# Patient Record
Sex: MALE | Race: WHITE | NOT HISPANIC OR LATINO | Employment: OTHER | URBAN - METROPOLITAN AREA
[De-identification: names, ages, dates, MRNs, and addresses within clinical notes are randomized per-mention and may not be internally consistent; named-entity substitution may affect disease eponyms.]

---

## 2019-09-23 ENCOUNTER — HOSPITAL ENCOUNTER (OUTPATIENT)
Dept: RADIOLOGY | Facility: MEDICAL CENTER | Age: 84
End: 2019-09-23

## 2019-09-23 ENCOUNTER — HOSPITAL ENCOUNTER (INPATIENT)
Facility: MEDICAL CENTER | Age: 84
LOS: 2 days | DRG: 084 | End: 2019-09-25
Attending: EMERGENCY MEDICINE | Admitting: FAMILY MEDICINE
Payer: MEDICARE

## 2019-09-23 DIAGNOSIS — S06.5XAA SDH (SUBDURAL HEMATOMA) (HCC): ICD-10-CM

## 2019-09-23 DIAGNOSIS — I95.1 ORTHOSTATIC HYPOTENSION: ICD-10-CM

## 2019-09-23 DIAGNOSIS — I10 ESSENTIAL HYPERTENSION: ICD-10-CM

## 2019-09-23 DIAGNOSIS — R29.6 FREQUENT FALLS: ICD-10-CM

## 2019-09-23 PROBLEM — E83.42 HYPOMAGNESEMIA: Status: ACTIVE | Noted: 2019-09-23

## 2019-09-23 PROBLEM — E87.6 HYPOKALEMIA: Status: ACTIVE | Noted: 2019-09-23

## 2019-09-23 LAB
ABO + RH BLD: NORMAL
ABO GROUP BLD: NORMAL
ALBUMIN SERPL BCP-MCNC: 4.2 G/DL (ref 3.2–4.9)
ALBUMIN/GLOB SERPL: 1.4 G/DL
ALP SERPL-CCNC: 74 U/L (ref 30–99)
ALT SERPL-CCNC: 11 U/L (ref 2–50)
ANION GAP SERPL CALC-SCNC: 9 MMOL/L (ref 0–11.9)
APTT PPP: 32.3 SEC (ref 24.7–36)
AST SERPL-CCNC: 15 U/L (ref 12–45)
BILIRUB SERPL-MCNC: 0.5 MG/DL (ref 0.1–1.5)
BLD GP AB SCN SERPL QL: NORMAL
BUN SERPL-MCNC: 25 MG/DL (ref 8–22)
CALCIUM SERPL-MCNC: 9.6 MG/DL (ref 8.5–10.5)
CFT BLD TEG: 6.4 MIN (ref 5–10)
CHLORIDE SERPL-SCNC: 99 MMOL/L (ref 96–112)
CLOT ANGLE BLD TEG: 67.8 DEGREES (ref 53–72)
CLOT LYSIS 30M P MA LENFR BLD TEG: 0.2 % (ref 0–8)
CO2 SERPL-SCNC: 31 MMOL/L (ref 20–33)
CREAT SERPL-MCNC: 0.9 MG/DL (ref 0.5–1.4)
CT.EXTRINSIC BLD ROTEM: 1.7 MIN (ref 1–3)
ERYTHROCYTE [DISTWIDTH] IN BLOOD BY AUTOMATED COUNT: 46.1 FL (ref 35.9–50)
ETHANOL BLD-MCNC: 0.01 G/DL
GLOBULIN SER CALC-MCNC: 3 G/DL (ref 1.9–3.5)
GLUCOSE SERPL-MCNC: 91 MG/DL (ref 65–99)
HCT VFR BLD AUTO: 40.7 % (ref 42–52)
HGB BLD-MCNC: 13 G/DL (ref 14–18)
INR PPP: 1.07 (ref 0.87–1.13)
MAGNESIUM SERPL-MCNC: 1.9 MG/DL (ref 1.5–2.5)
MCF BLD TEG: 69.2 MM (ref 50–70)
MCH RBC QN AUTO: 30.4 PG (ref 27–33)
MCHC RBC AUTO-ENTMCNC: 31.9 G/DL (ref 33.7–35.3)
MCV RBC AUTO: 95.1 FL (ref 81.4–97.8)
PA AA BLD-ACNC: 2.9 %
PA ADP BLD-ACNC: 0 %
PHOSPHATE SERPL-MCNC: 3.2 MG/DL (ref 2.5–4.5)
PLATELET # BLD AUTO: 263 K/UL (ref 164–446)
PMV BLD AUTO: 8.6 FL (ref 9–12.9)
POTASSIUM SERPL-SCNC: 3.5 MMOL/L (ref 3.6–5.5)
PROT SERPL-MCNC: 7.2 G/DL (ref 6–8.2)
PROTHROMBIN TIME: 14.2 SEC (ref 12–14.6)
RBC # BLD AUTO: 4.28 M/UL (ref 4.7–6.1)
RH BLD: NORMAL
SODIUM SERPL-SCNC: 139 MMOL/L (ref 135–145)
TEG ALGORITHM TGALG: NORMAL
WBC # BLD AUTO: 8.7 K/UL (ref 4.8–10.8)

## 2019-09-23 PROCEDURE — 96365 THER/PROPH/DIAG IV INF INIT: CPT

## 2019-09-23 PROCEDURE — 85384 FIBRINOGEN ACTIVITY: CPT

## 2019-09-23 PROCEDURE — 770020 HCHG ROOM/CARE - TELE (206)

## 2019-09-23 PROCEDURE — 80307 DRUG TEST PRSMV CHEM ANLYZR: CPT

## 2019-09-23 PROCEDURE — 305950 HCHG YELLOW TRAUMA ACT PRE-NOTIFY NO CC

## 2019-09-23 PROCEDURE — 80053 COMPREHEN METABOLIC PANEL: CPT

## 2019-09-23 PROCEDURE — 83735 ASSAY OF MAGNESIUM: CPT

## 2019-09-23 PROCEDURE — 99223 1ST HOSP IP/OBS HIGH 75: CPT | Performed by: FAMILY MEDICINE

## 2019-09-23 PROCEDURE — 84100 ASSAY OF PHOSPHORUS: CPT

## 2019-09-23 PROCEDURE — 86850 RBC ANTIBODY SCREEN: CPT

## 2019-09-23 PROCEDURE — A9270 NON-COVERED ITEM OR SERVICE: HCPCS | Performed by: FAMILY MEDICINE

## 2019-09-23 PROCEDURE — 85576 BLOOD PLATELET AGGREGATION: CPT | Mod: 91

## 2019-09-23 PROCEDURE — 85347 COAGULATION TIME ACTIVATED: CPT

## 2019-09-23 PROCEDURE — 86900 BLOOD TYPING SEROLOGIC ABO: CPT

## 2019-09-23 PROCEDURE — 85730 THROMBOPLASTIN TIME PARTIAL: CPT

## 2019-09-23 PROCEDURE — 85610 PROTHROMBIN TIME: CPT

## 2019-09-23 PROCEDURE — 700102 HCHG RX REV CODE 250 W/ 637 OVERRIDE(OP): Performed by: FAMILY MEDICINE

## 2019-09-23 PROCEDURE — 86901 BLOOD TYPING SEROLOGIC RH(D): CPT

## 2019-09-23 PROCEDURE — 700111 HCHG RX REV CODE 636 W/ 250 OVERRIDE (IP): Performed by: FAMILY MEDICINE

## 2019-09-23 PROCEDURE — 700105 HCHG RX REV CODE 258: Performed by: FAMILY MEDICINE

## 2019-09-23 PROCEDURE — 85027 COMPLETE CBC AUTOMATED: CPT

## 2019-09-23 PROCEDURE — 99285 EMERGENCY DEPT VISIT HI MDM: CPT

## 2019-09-23 RX ORDER — BISACODYL 10 MG
10 SUPPOSITORY, RECTAL RECTAL
Status: DISCONTINUED | OUTPATIENT
Start: 2019-09-23 | End: 2019-09-25 | Stop reason: HOSPADM

## 2019-09-23 RX ORDER — FINASTERIDE 5 MG/1
5 TABLET, FILM COATED ORAL DAILY
COMMUNITY

## 2019-09-23 RX ORDER — FINASTERIDE 5 MG/1
5 TABLET, FILM COATED ORAL DAILY
Status: DISCONTINUED | OUTPATIENT
Start: 2019-09-24 | End: 2019-09-25 | Stop reason: HOSPADM

## 2019-09-23 RX ORDER — LABETALOL HYDROCHLORIDE 5 MG/ML
10 INJECTION, SOLUTION INTRAVENOUS EVERY 4 HOURS PRN
Status: DISCONTINUED | OUTPATIENT
Start: 2019-09-23 | End: 2019-09-25 | Stop reason: HOSPADM

## 2019-09-23 RX ORDER — LOSARTAN POTASSIUM 25 MG/1
25 TABLET ORAL DAILY
Status: ON HOLD | COMMUNITY
End: 2019-09-25

## 2019-09-23 RX ORDER — POLYETHYLENE GLYCOL 3350 17 G/17G
1 POWDER, FOR SOLUTION ORAL
Status: DISCONTINUED | OUTPATIENT
Start: 2019-09-23 | End: 2019-09-25 | Stop reason: HOSPADM

## 2019-09-23 RX ORDER — LOSARTAN POTASSIUM 25 MG/1
25 TABLET ORAL DAILY
Status: DISCONTINUED | OUTPATIENT
Start: 2019-09-24 | End: 2019-09-25

## 2019-09-23 RX ORDER — AMOXICILLIN 250 MG
2 CAPSULE ORAL 2 TIMES DAILY
Status: DISCONTINUED | OUTPATIENT
Start: 2019-09-23 | End: 2019-09-25 | Stop reason: HOSPADM

## 2019-09-23 RX ORDER — POTASSIUM CHLORIDE 20 MEQ/1
20 TABLET, EXTENDED RELEASE ORAL DAILY
Status: DISCONTINUED | OUTPATIENT
Start: 2019-09-23 | End: 2019-09-25 | Stop reason: HOSPADM

## 2019-09-23 RX ORDER — SODIUM CHLORIDE 9 MG/ML
INJECTION, SOLUTION INTRAVENOUS CONTINUOUS
Status: DISCONTINUED | OUTPATIENT
Start: 2019-09-23 | End: 2019-09-24

## 2019-09-23 RX ORDER — ENALAPRILAT 1.25 MG/ML
1.25 INJECTION INTRAVENOUS EVERY 6 HOURS PRN
Status: DISCONTINUED | OUTPATIENT
Start: 2019-09-23 | End: 2019-09-25 | Stop reason: HOSPADM

## 2019-09-23 RX ORDER — ACETAMINOPHEN 325 MG/1
650 TABLET ORAL EVERY 6 HOURS PRN
Status: DISCONTINUED | OUTPATIENT
Start: 2019-09-23 | End: 2019-09-25 | Stop reason: HOSPADM

## 2019-09-23 RX ORDER — MAGNESIUM SULFATE HEPTAHYDRATE 40 MG/ML
2 INJECTION, SOLUTION INTRAVENOUS ONCE
Status: COMPLETED | OUTPATIENT
Start: 2019-09-23 | End: 2019-09-23

## 2019-09-23 RX ADMIN — MAGNESIUM SULFATE IN WATER 2 G: 40 INJECTION, SOLUTION INTRAVENOUS at 21:46

## 2019-09-23 RX ADMIN — SODIUM CHLORIDE: 9 INJECTION, SOLUTION INTRAVENOUS at 19:39

## 2019-09-23 RX ADMIN — POTASSIUM CHLORIDE 20 MEQ: 1500 TABLET, EXTENDED RELEASE ORAL at 19:39

## 2019-09-23 RX ADMIN — ENALAPRILAT 1.25 MG: 1.25 INJECTION INTRAVENOUS at 23:45

## 2019-09-23 SDOH — HEALTH STABILITY: MENTAL HEALTH: HOW OFTEN DO YOU HAVE A DRINK CONTAINING ALCOHOL?: NEVER

## 2019-09-23 ASSESSMENT — ENCOUNTER SYMPTOMS
SENSORY CHANGE: 0
BLURRED VISION: 0
BACK PAIN: 0
SPEECH CHANGE: 0
WHEEZING: 0
HEARTBURN: 0
DIZZINESS: 0
DIAPHORESIS: 0
NAUSEA: 0
FALLS: 1
NERVOUS/ANXIOUS: 0
DIARRHEA: 0
ABDOMINAL PAIN: 0
SEIZURES: 0
FLANK PAIN: 0
FOCAL WEAKNESS: 0
HEADACHES: 0
SHORTNESS OF BREATH: 0
LOSS OF CONSCIOUSNESS: 0
NECK PAIN: 0
VOMITING: 0
SORE THROAT: 0
CHILLS: 0
COUGH: 0
PALPITATIONS: 0
FEVER: 0
WEAKNESS: 0

## 2019-09-23 ASSESSMENT — LIFESTYLE VARIABLES
TOTAL SCORE: 0
DO YOU DRINK ALCOHOL: NO
CONSUMPTION TOTAL: INCOMPLETE
EVER FELT BAD OR GUILTY ABOUT YOUR DRINKING: NO
TOTAL SCORE: 0
HAVE PEOPLE ANNOYED YOU BY CRITICIZING YOUR DRINKING: NO
HAVE YOU EVER FELT YOU SHOULD CUT DOWN ON YOUR DRINKING: NO
ALCOHOL_USE: NO
EVER_SMOKED: NEVER
TOTAL SCORE: 0
EVER HAD A DRINK FIRST THING IN THE MORNING TO STEADY YOUR NERVES TO GET RID OF A HANGOVER: NO

## 2019-09-23 ASSESSMENT — PATIENT HEALTH QUESTIONNAIRE - PHQ9
2. FEELING DOWN, DEPRESSED, IRRITABLE, OR HOPELESS: NOT AT ALL
SUM OF ALL RESPONSES TO PHQ9 QUESTIONS 1 AND 2: 0
1. LITTLE INTEREST OR PLEASURE IN DOING THINGS: NOT AT ALL

## 2019-09-24 ENCOUNTER — HOSPITAL ENCOUNTER (OUTPATIENT)
Dept: RADIOLOGY | Facility: MEDICAL CENTER | Age: 84
End: 2019-09-24
Attending: FAMILY MEDICINE
Payer: MEDICARE

## 2019-09-24 PROBLEM — E53.8 VITAMIN B12 DEFICIENCY: Status: ACTIVE | Noted: 2019-09-24

## 2019-09-24 PROBLEM — I95.1 ORTHOSTATIC HYPOTENSION: Status: ACTIVE | Noted: 2019-09-24

## 2019-09-24 PROBLEM — E55.9 VITAMIN D DEFICIENCY: Status: ACTIVE | Noted: 2019-09-24

## 2019-09-24 LAB
25(OH)D3 SERPL-MCNC: 21 NG/ML (ref 30–100)
ANION GAP SERPL CALC-SCNC: 9 MMOL/L (ref 0–11.9)
BASOPHILS # BLD AUTO: 0.6 % (ref 0–1.8)
BASOPHILS # BLD: 0.05 K/UL (ref 0–0.12)
BUN SERPL-MCNC: 21 MG/DL (ref 8–22)
CALCIUM SERPL-MCNC: 8.8 MG/DL (ref 8.5–10.5)
CHLORIDE SERPL-SCNC: 103 MMOL/L (ref 96–112)
CO2 SERPL-SCNC: 26 MMOL/L (ref 20–33)
CREAT SERPL-MCNC: 0.72 MG/DL (ref 0.5–1.4)
EOSINOPHIL # BLD AUTO: 0.04 K/UL (ref 0–0.51)
EOSINOPHIL NFR BLD: 0.5 % (ref 0–6.9)
ERYTHROCYTE [DISTWIDTH] IN BLOOD BY AUTOMATED COUNT: 45.1 FL (ref 35.9–50)
GLUCOSE SERPL-MCNC: 89 MG/DL (ref 65–99)
HCT VFR BLD AUTO: 37.8 % (ref 42–52)
HGB BLD-MCNC: 12.5 G/DL (ref 14–18)
IMM GRANULOCYTES # BLD AUTO: 0.03 K/UL (ref 0–0.11)
IMM GRANULOCYTES NFR BLD AUTO: 0.4 % (ref 0–0.9)
LYMPHOCYTES # BLD AUTO: 1.09 K/UL (ref 1–4.8)
LYMPHOCYTES NFR BLD: 14.1 % (ref 22–41)
MCH RBC QN AUTO: 31.6 PG (ref 27–33)
MCHC RBC AUTO-ENTMCNC: 33.1 G/DL (ref 33.7–35.3)
MCV RBC AUTO: 95.5 FL (ref 81.4–97.8)
MONOCYTES # BLD AUTO: 0.84 K/UL (ref 0–0.85)
MONOCYTES NFR BLD AUTO: 10.9 % (ref 0–13.4)
NEUTROPHILS # BLD AUTO: 5.67 K/UL (ref 1.82–7.42)
NEUTROPHILS NFR BLD: 73.5 % (ref 44–72)
NRBC # BLD AUTO: 0 K/UL
NRBC BLD-RTO: 0 /100 WBC
PLATELET # BLD AUTO: 226 K/UL (ref 164–446)
PMV BLD AUTO: 8.9 FL (ref 9–12.9)
POTASSIUM SERPL-SCNC: 3.6 MMOL/L (ref 3.6–5.5)
RBC # BLD AUTO: 3.96 M/UL (ref 4.7–6.1)
SODIUM SERPL-SCNC: 138 MMOL/L (ref 135–145)
TSH SERPL DL<=0.005 MIU/L-ACNC: 3.11 UIU/ML (ref 0.38–5.33)
VIT B12 SERPL-MCNC: 238 PG/ML (ref 211–911)
WBC # BLD AUTO: 7.7 K/UL (ref 4.8–10.8)

## 2019-09-24 PROCEDURE — 700102 HCHG RX REV CODE 250 W/ 637 OVERRIDE(OP): Performed by: FAMILY MEDICINE

## 2019-09-24 PROCEDURE — 700105 HCHG RX REV CODE 258: Performed by: FAMILY MEDICINE

## 2019-09-24 PROCEDURE — 700105 HCHG RX REV CODE 258: Performed by: INTERNAL MEDICINE

## 2019-09-24 PROCEDURE — 97161 PT EVAL LOW COMPLEX 20 MIN: CPT

## 2019-09-24 PROCEDURE — 84443 ASSAY THYROID STIM HORMONE: CPT

## 2019-09-24 PROCEDURE — 80048 BASIC METABOLIC PNL TOTAL CA: CPT

## 2019-09-24 PROCEDURE — 85025 COMPLETE CBC W/AUTO DIFF WBC: CPT

## 2019-09-24 PROCEDURE — 700102 HCHG RX REV CODE 250 W/ 637 OVERRIDE(OP): Performed by: INTERNAL MEDICINE

## 2019-09-24 PROCEDURE — A9270 NON-COVERED ITEM OR SERVICE: HCPCS | Performed by: FAMILY MEDICINE

## 2019-09-24 PROCEDURE — 82607 VITAMIN B-12: CPT

## 2019-09-24 PROCEDURE — 82306 VITAMIN D 25 HYDROXY: CPT

## 2019-09-24 PROCEDURE — 70450 CT HEAD/BRAIN W/O DYE: CPT

## 2019-09-24 PROCEDURE — 700101 HCHG RX REV CODE 250: Performed by: FAMILY MEDICINE

## 2019-09-24 PROCEDURE — 97535 SELF CARE MNGMENT TRAINING: CPT

## 2019-09-24 PROCEDURE — A9270 NON-COVERED ITEM OR SERVICE: HCPCS | Performed by: INTERNAL MEDICINE

## 2019-09-24 PROCEDURE — 99233 SBSQ HOSP IP/OBS HIGH 50: CPT | Performed by: INTERNAL MEDICINE

## 2019-09-24 PROCEDURE — 36415 COLL VENOUS BLD VENIPUNCTURE: CPT

## 2019-09-24 PROCEDURE — 770020 HCHG ROOM/CARE - TELE (206)

## 2019-09-24 PROCEDURE — 700111 HCHG RX REV CODE 636 W/ 250 OVERRIDE (IP): Performed by: FAMILY MEDICINE

## 2019-09-24 RX ORDER — ERGOCALCIFEROL 1.25 MG/1
50000 CAPSULE ORAL
Status: DISCONTINUED | OUTPATIENT
Start: 2019-09-24 | End: 2019-09-25 | Stop reason: HOSPADM

## 2019-09-24 RX ORDER — FAMOTIDINE 20 MG/1
20 TABLET, FILM COATED ORAL 2 TIMES DAILY
Status: DISCONTINUED | OUTPATIENT
Start: 2019-09-24 | End: 2019-09-25 | Stop reason: HOSPADM

## 2019-09-24 RX ORDER — SODIUM CHLORIDE 9 MG/ML
1000 INJECTION, SOLUTION INTRAVENOUS ONCE
Status: COMPLETED | OUTPATIENT
Start: 2019-09-24 | End: 2019-09-24

## 2019-09-24 RX ORDER — CHOLECALCIFEROL (VITAMIN D3) 125 MCG
1000 CAPSULE ORAL DAILY
Status: DISCONTINUED | OUTPATIENT
Start: 2019-09-24 | End: 2019-09-25 | Stop reason: HOSPADM

## 2019-09-24 RX ORDER — CYANOCOBALAMIN 1000 UG/ML
1000 INJECTION, SOLUTION INTRAMUSCULAR; SUBCUTANEOUS DAILY
Status: DISCONTINUED | OUTPATIENT
Start: 2019-09-24 | End: 2019-09-25 | Stop reason: HOSPADM

## 2019-09-24 RX ADMIN — SENNOSIDES, DOCUSATE SODIUM 2 TABLET: 50; 8.6 TABLET, FILM COATED ORAL at 05:01

## 2019-09-24 RX ADMIN — SODIUM CHLORIDE: 9 INJECTION, SOLUTION INTRAVENOUS at 12:34

## 2019-09-24 RX ADMIN — CYANOCOBALAMIN TAB 500 MCG 1000 MCG: 500 TAB at 15:26

## 2019-09-24 RX ADMIN — ENALAPRILAT 1.25 MG: 1.25 INJECTION INTRAVENOUS at 12:20

## 2019-09-24 RX ADMIN — SENNOSIDES, DOCUSATE SODIUM 2 TABLET: 50; 8.6 TABLET, FILM COATED ORAL at 17:23

## 2019-09-24 RX ADMIN — FINASTERIDE 5 MG: 5 TABLET, FILM COATED ORAL at 05:01

## 2019-09-24 RX ADMIN — VITAMIN D, TAB 1000IU (100/BT) 1000 UNITS: 25 TAB at 05:01

## 2019-09-24 RX ADMIN — SODIUM CHLORIDE 1000 ML: 9 INJECTION, SOLUTION INTRAVENOUS at 15:31

## 2019-09-24 RX ADMIN — LOSARTAN POTASSIUM 25 MG: 25 TABLET ORAL at 05:01

## 2019-09-24 RX ADMIN — LABETALOL HYDROCHLORIDE 10 MG: 5 INJECTION, SOLUTION INTRAVENOUS at 23:55

## 2019-09-24 RX ADMIN — ENALAPRILAT 1.25 MG: 1.25 INJECTION INTRAVENOUS at 18:41

## 2019-09-24 RX ADMIN — ERGOCALCIFEROL 50000 UNITS: 1.25 CAPSULE ORAL at 15:37

## 2019-09-24 ASSESSMENT — LIFESTYLE VARIABLES
TOTAL SCORE: 0
CONSUMPTION TOTAL: NEGATIVE
ON A TYPICAL DAY WHEN YOU DRINK ALCOHOL HOW MANY DRINKS DO YOU HAVE: 0
TOTAL SCORE: 0
EVER HAD A DRINK FIRST THING IN THE MORNING TO STEADY YOUR NERVES TO GET RID OF A HANGOVER: NO
HAVE PEOPLE ANNOYED YOU BY CRITICIZING YOUR DRINKING: NO
EVER FELT BAD OR GUILTY ABOUT YOUR DRINKING: NO
TOTAL SCORE: 0
ALCOHOL_USE: NO
HOW MANY TIMES IN THE PAST YEAR HAVE YOU HAD 5 OR MORE DRINKS IN A DAY: 0
AVERAGE NUMBER OF DAYS PER WEEK YOU HAVE A DRINK CONTAINING ALCOHOL: 0
HAVE YOU EVER FELT YOU SHOULD CUT DOWN ON YOUR DRINKING: NO

## 2019-09-24 ASSESSMENT — COGNITIVE AND FUNCTIONAL STATUS - GENERAL
PERSONAL GROOMING: A LITTLE
MOBILITY SCORE: 18
MOVING FROM LYING ON BACK TO SITTING ON SIDE OF FLAT BED: A LITTLE
DRESSING REGULAR UPPER BODY CLOTHING: A LITTLE
TOILETING: A LITTLE
SUGGESTED CMS G CODE MODIFIER MOBILITY: CK
TURNING FROM BACK TO SIDE WHILE IN FLAT BAD: A LITTLE
CLIMB 3 TO 5 STEPS WITH RAILING: A LITTLE
HELP NEEDED FOR BATHING: A LITTLE
TURNING FROM BACK TO SIDE WHILE IN FLAT BAD: A LITTLE
EATING MEALS: A LITTLE
MOVING TO AND FROM BED TO CHAIR: A LITTLE
SUGGESTED CMS G CODE MODIFIER MOBILITY: CK
DRESSING REGULAR LOWER BODY CLOTHING: A LITTLE
STANDING UP FROM CHAIR USING ARMS: A LITTLE
SUGGESTED CMS G CODE MODIFIER DAILY ACTIVITY: CK
DAILY ACTIVITIY SCORE: 18
MOVING TO AND FROM BED TO CHAIR: A LITTLE
STANDING UP FROM CHAIR USING ARMS: A LITTLE
MOVING FROM LYING ON BACK TO SITTING ON SIDE OF FLAT BED: A LITTLE
WALKING IN HOSPITAL ROOM: A LITTLE
WALKING IN HOSPITAL ROOM: A LITTLE
MOBILITY SCORE: 18
CLIMB 3 TO 5 STEPS WITH RAILING: A LITTLE

## 2019-09-24 ASSESSMENT — ENCOUNTER SYMPTOMS
COUGH: 0
ABDOMINAL PAIN: 0
TINGLING: 0
DIZZINESS: 1
BLURRED VISION: 0
VOMITING: 0
DOUBLE VISION: 0
FEVER: 0
MYALGIAS: 0
HEADACHES: 0
DIARRHEA: 0
SHORTNESS OF BREATH: 0
CONSTIPATION: 0
NAUSEA: 0
CHILLS: 0
TREMORS: 0
HEARTBURN: 0

## 2019-09-24 ASSESSMENT — GAIT ASSESSMENTS
DISTANCE (FEET): 400
ASSISTIVE DEVICE: FRONT WHEEL WALKER
GAIT LEVEL OF ASSIST: SUPERVISED
DEVIATION: BRADYKINETIC;DECREASED BASE OF SUPPORT;SHUFFLED GAIT;DECREASED HEEL STRIKE;DECREASED TOE OFF

## 2019-09-24 NOTE — PROGRESS NOTES
Patient arrived from ER with SHON Hankins. Patient able to stand on scale and walk a few steps to bed. Patient no complaints of pain or dizziness. Oriented to room, call light, bed alarm, fall risks.

## 2019-09-24 NOTE — DISCHARGE PLANNING
Trauma Response    Referral: Trauma Yellow Response    Intervention: SW responded to trauma yellow.  Pt was BIB [facility] after GLF.  Pt was alert and talking upon arrival.  Pts name is Fred Osborne (: 1926).  SW obtained the following pt information: [facility], hitting back of his head.     Plan: SW will continue to follow as needed.

## 2019-09-24 NOTE — CARE PLAN
Problem: Safety  Goal: Will remain free from injury  Outcome: PROGRESSING AS EXPECTED   Safety precautions and fall prevention interventions in place. Fall prevention education provided, pt verbalized understanding. Bed in low/locked position, treaded socks on pt, call bell is within reach. Appropriate devices provided with ambulation assistance. Pt calls appropriately for help.   Problem: Knowledge Deficit  Goal: Knowledge of disease process/condition, treatment plan, diagnostic tests, and medications will improve  Outcome: PROGRESSING AS EXPECTED   Pt and family updated on POC, including medications, treatment and discharge planning. Questions answered as needed, pt and family verbalized understanding. Encouraged to voice further questions/concerns.

## 2019-09-24 NOTE — CONSULTS
Trauma Consultation  9/23/2019    Attending Physician: Tommy Rivera MD.     CC: Trauma The patient was triaged as a Trauma Yellow Transfer in accordance with established pre hospital protocols. An expeditious primary and secondary survey with required adjuncts was conducted. See trauma narrator for full details.    HPI: This is a 89 y.o. male who sustained a ground level fall this morning and hit the back of his head on the ground. He denies losing consciousness. He was taken to his primary care physician who apparently ordered a CT head which showed chronic bilateral subdural hemorrhages. [place] [facility] [place] a trauma yellow on the suspicion that the patient takes aspirin. The patient denies taking aspirin and instead says he takes motrin.     Past Medical History:   Diagnosis Date   • Hypertension    BPH  GERD      Denies major prior surgery    Current Facility-Administered Medications   Medication Dose Route Frequency Provider Last Rate Last Dose   • [START ON 9/24/2019] finasteride (PROSCAR) tablet 5 mg  5 mg Oral DAILY Bogdan Saez M.D.       • [START ON 9/24/2019] losartan (COZAAR) tablet 25 mg  25 mg Oral DAILY Bogdan Saez M.D.       • [START ON 9/24/2019] vitamin D (cholecalciferol) tablet 1,000 Units  1,000 Units Oral DAILY Bogdan Saez M.D.       • senna-docusate (PERICOLACE or SENOKOT S) 8.6-50 MG per tablet 2 Tab  2 Tab Oral BID Bogdan Saez M.D.        And   • polyethylene glycol/lytes (MIRALAX) PACKET 1 Packet  1 Packet Oral QDAY PRN Bogdan Saez M.D.        And   • magnesium hydroxide (MILK OF MAGNESIA) suspension 30 mL  30 mL Oral QDAY PRN Bogdan Saez M.D.        And   • bisacodyl (DULCOLAX) suppository 10 mg  10 mg Rectal QDAY PRN Bogdan Saez M.D.       • NS infusion   Intravenous Continuous Bogdan Saez M.D. 50 mL/hr at 09/23/19 1939     • acetaminophen (TYLENOL) tablet 650 mg  650 mg Oral Q6HRS PRN Bogdan Saez M.D.       •  "enalaprilat (VASOTEC) injection 1.25 mg  1.25 mg Intravenous Q6HRS PRN Bogdan Saez M.D.       • labetalol (NORMODYNE,TRANDATE) injection 10 mg  10 mg Intravenous Q4HRS PRN Bogdan Saez M.D.       • potassium chloride SA (Kdur) tablet 20 mEq  20 mEq Oral DAILY Bogdan Saez M.D.   20 mEq at 09/23/19 1939   • magnesium sulfate IVPB premix 2 g  2 g Intravenous Once Bogdan Saez M.D.         Current Outpatient Medications   Medication Sig Dispense Refill   • losartan (COZAAR) 25 MG Tab Take 25 mg by mouth every day.     • vitamin D (CHOLECALCIFEROL) 1000 UNIT Tab Take 1,000 Units by mouth every day.     • finasteride (PROSCAR) 5 MG Tab Take 5 mg by mouth every day.         Social History     Tobacco Use   • Smoking status: Never Smoker   Substance Use Topics   • Alcohol use: Not Currently     Frequency: Never       Family History   Family history unknown: Yes       Allergies:  Patient has no known allergies.    Review of Systems:  Unable to assess due to the acuity of the situation      Physical Exam:  /70   Pulse 60   Temp 36.7 °C (98.1 °F) (Temporal)   Resp 17   Ht 1.651 m (5' 5\")   Wt 52.2 kg (115 lb)   SpO2 92%     Constitutional: Awake, alert, oriented x3. No acute distress. GCS 15. E4 V5 M6.  Head: No cephalohematoma. Pupils 4-3 reactive bilaterally. Midface stable. No malocclusion.    Neck: No tracheal deviation. No midline cervical spine tenderness.  No cervical seatbelt sign.  Cardiovascular: Normal rate, regular rhythm, normal heart sounds and intact distal pulses.  Exam reveals no gallop and no friction rub.  No murmur heard.  Pulmonary/Chest: Clavicles nontender to palpation. There is not any chest wall tenderness bilaterally.  No crepitus. Positive breath sounds bilaterally.   Abdominal: Soft, nondistended. Nontender to palpation. Pelvis is stable to anterior-posterior compression. No abdominal seatbelt sign.   Musculoskeletal: Right upper extremity grossly atraumatic, " palpable radial pulse. 5/5  strength. Full ROM and strength at elbow.  Left upper extremity grossly atraumatic, palpable radial pulse. 5/5  strength. Full ROM and strength at elbow.  Right lower extremity grossly atraumatic. 5/5 strength in ankle plantar flexion and dorsiflexion. No pain and full ROM at right knee and hip. 2+ DP pulse.  Left  lower extremity grossly atraumatic. 5/5 strength in ankle plantar flexion and dorsiflexion. No pain and full ROM at left knee and hip. 2+ DP pulse.  Back: Midline thoracic and lumbar spines are nontender to palpation. No step-offs. Mild sacral erythema present.  : Normal male external genitalia. Rectal exam not done. No blood visible at urethral meatus.   Neurological: Sensation grossly intact to light touch dorsum and plantar surfaces of both feet and the medial and lateral aspects of both lower legs.  Sensation grossly intact to light touch dorsum and plantar surfaces of both hands.   Skin: Skin is warm and dry.  No diaphoresis. No erythema. No pallor.   Psychiatric:  Normal mood and affect.  Behavior is appropriate. Calm and pleasant.        Labs:  Recent Labs     09/23/19  1723   WBC 8.7   RBC 4.28*   HEMOGLOBIN 13.0*   HEMATOCRIT 40.7*   MCV 95.1   MCH 30.4   MCHC 31.9*   RDW 46.1   PLATELETCT 263   MPV 8.6*     Recent Labs     09/23/19  1723   SODIUM 139   POTASSIUM 3.5*   CHLORIDE 99   CO2 31   GLUCOSE 91   BUN 25*   CREATININE 0.90   CALCIUM 9.6     Recent Labs     09/23/19  1723   APTT 32.3   INR 1.07     Recent Labs     09/23/19  1723   ASTSGOT 15   ALTSGPT 11   TBILIRUBIN 0.5   ALKPHOSPHAT 74   GLOBULIN 3.0   INR 1.07         Radiology:  OUTSIDE IMAGES-CT HEAD   Final Result      CT-HEAD W/O    (Results Pending)     CT scans from referring show a chronic left subdural hematoma and a chronic right subdural hematoma with possibly some aspect of subacute hemorrhage. No acute hemorrhage seen.     Assessment: This is a 89 y.o. 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    Plan: Admit to medicine, neurosurgery consult.  Please call with any questions or concerns.     No new Assessment & Plan notes have been filed under this hospital service since the last note was generated.  Service: Surgery General        Time spent: 50 minutes.          Tommy Rivera MD  [phone]

## 2019-09-24 NOTE — PROGRESS NOTES
Bedside report received from SHON Bradford. Assumed care of pt. Pt awake, laying in bed. A/Ox4, VSS. No concerns, complaints or distress. Pt educated to call before getting out of bed. POC reviewed and white board updated. Tele box on. SR 61on the monitor. Call light in reach. Bed locked in lowest position with 2 upper bed rails up. Bed alarm on.

## 2019-09-24 NOTE — ASSESSMENT & PLAN NOTE
Neurochecks  Neurosurgery consulted. No further intervention   Avoid NSAIDs, blood thinner   Need repeat head CT in 2-3 weeks. Follow up with neurosurgery as OP

## 2019-09-24 NOTE — PROGRESS NOTES
Completed 2 RN skin check upon arrival from ED. Patient has generalized bruising and fragile skin. Coccyx red, blanching; applied mepilex to area for protection. Performed with SHON Hankins.

## 2019-09-24 NOTE — CONSULTS
Neurosurgery Consultation  Referring MD:  Dr. [facility]  Reason for referral:  Bilateral subacute/chronic SDH  9/24/2019 1830     HPI: Patient is a 89-year-old male who fell this morning hitting his head.  He had a headache, and went to another facility where imaging studies appear to show bilateral acute on chronic subdural hematomas.  He was transferred to [facility] for further evaluation.  On arrival he was complaining of a slight headache and seemed slightly slow to respond.  He denied dizzyness numbness or lightheadedness before falling. He was admitted to the Hospitalist service for observation.       Review of Systems: Positive mild headache negative for dizziness numbness lightheadedness chest pain shortness of breath abdominal pain nausea vomiting diarrhea extremity pain numbness tingling.  Review of systems reviewed with patient, all other systems     Past medical/surgical history: BPH reflux hypertension     Medications: Finasteride     Allergies: None     Social History: No drug or alcohol use        Physical exam: Constitutional: Pleasant male awake alert appeared tired  Vital signs: Temperature 98.1 pulse 63 respirations 13 blood pressure 186/76 pulse oximetry 93% on room air  HEENT:  No rhinorrhe, no otorrhea, good neck motion.   Musculoskeletal:  good range of motion  Neurologic:   Awake, alert   Speech fluent appropriate  In no apparent distress  Affect, mood appropriate  Oriented x 3  Pupils 3 mm midline, reactive.  Conjugate gaze.  Visual fields full to confrontation  Face symmetric  Tongue midline without fasciculation  Facial sensation intact light touch  Hearing intact to conversation, light finger rub bilaterally  Motor:  Bilateral SCM, shoulder shrug, deltoid, bicep, tricep, , wrist extension, hand intrinsics                IP, thigh adduction, thigh abduction,dorsiflexion,                Plantar flexion, foot inversion, foot eversion, EHL 5/5 no pronator drift  Sensation:   Intact touch face, neck, torso, four extremities  Reflex:  No Lynn's no clonus  Finger-nose-finger, VINNIE unremarkable    Skin: Some bruising is present on the right forearm.  No other rash or lesion seen, no palpable dermatologic lesions identified.  Psychiatric: not anxious, delusional, or hallucinating    CT head:  8 mm bilateral subdural fluid collections, chronic on right subacute on left by density.  Mild flattening of cortical surface.  No midline shift.  Fairly significant diffuse cerebral atrophy.    AP:  89 year old male with bilateral subdural hemotoma, chronic on right subacute on left.  He is not having over symptoms from this.  Given his age of 89, would not recommend Neurosurgical intervention as complication rate is significantly elevated.

## 2019-09-24 NOTE — THERAPY
"Physical Therapy Evaluation completed.   Bed Mobility:  Supine to Sit: Supervised  Transfers: Sit to Stand: Supervised  Gait: Level Of Assist: Supervised with Front-Wheel Walker       Plan of Care: Will benefit from Physical Therapy 3 times per week  Discharge Recommendations: Equipment: No Equipment Needed. Post-acute therapy Discharge to home with outpatient or home health for additional skilled therapy services.    Pt is 89 y/o M s/p GLF. Pt was found with subdural hematoma that nurse reports will be followed outpatiently. Pt demonstrating mobility at SPV level with FWW. Pt tolerated amb x400ft with FWW. He demonstrated safe amb with negotiation of turns. Pt demonstrated 1 LOB with standing balance without walker, able to recover ind with hand on the wall. Pt was able to ascend/descend 1 step with HHA 2/2 walker too wide to use with platform step. Anticipate DC home, recommend that patient follow-up with home health or outpatient PT for higher level balance trng to reduce fall risk.     See \"Rehab Therapy-Acute\" Patient Summary Report for complete documentation.     "

## 2019-09-24 NOTE — ED PROVIDER NOTES
ED Provider Note    HPI: Patient is a 89-year-old male who presented to the emergency department September 23, 2019 at 5:10 PM with a chief complaint of head injury.    Patient fell this morning and hit his head.  He presented to another facility where imaging studies appear to show bilateral acute on chronic subdural hematomas.  The other facility apparently did not have the capability to care for this problem and patient was transferred here.  On arrival patient is complaining of a slight headache and seems slightly slow to respond.  He denied dizzyness numbness or lightheadedness before falling.  He has had no chest pain no shortness of breath no abdominal pain no nausea no vomiting no diarrhea.  No extremity pain numbness or tingling.  No other somatic complaint    Review of Systems: Positive mild headache negative for dizziness numbness lightheadedness chest pain shortness of breath abdominal pain nausea vomiting diarrhea extremity pain numbness tingling.  Review of systems reviewed with patient, all other systems    Past medical/surgical history: BPH reflux hypertension    Medications: Finasteride    Allergies: None    Social History: No drug or alcohol use      Physical exam: Constitutional: Pleasant male awake alert appeared tired  Vital signs: Temperature 98.1 pulse 63 respirations 13 blood pressure 186/76 pulse oximetry 93% on room air  EYES: PERRL, EOMI, Conjunctivae and sclera normal, eyelids normal bilaterally.  Neck: Trachea midline. No cervical masses seen or palpated. Normal range of motion, supple. No meningeal signs elicited.  Cardiac: Regular rate and rhythm. S1-S2 present. No S3 or S4 present. No murmurs, rubs, or gallops heard. No edema or varicosities were seen.   Lungs: Clear to auscultation with good aeration throughout. No wheezes, rales, or rhonchi heard. Patient's chest wall moved symmetrically with each respiratory effort. Patient was not making use of accessory muscles of respiration in  breathing.  Abdomen: Soft nontender to palpation. No rebound or guarding elicited. No organomegaly identified. No pulsatile abdominal masses identified.   Musculoskeletal:  no  pain with palpitation or movement of muscle, bone or joint , no obvious musculoskeletal deformities identified.  Neurologic: alert and awake answers questions appropriately but seems slightly slow to respond.. Moves all four extremities independently, no gross focal abnormalities identified. Normal strength and motor.  Skin: Some bruising is present on the right forearm.  No other rash or lesion seen, no palpable dermatologic lesions identified.  Psychiatric: not anxious, delusional, or hallucinating.    Medical decision making: Dr. Shetty consulted for neurosurgery.  He reviewed the results of the CT imaging from the other facility.  He did not think emergent intervention indicated.  He requested the patient be admitted by internal medicine service for observation    Laboratory studies obtained (please see lab sheet for all results) significant findings included unremarkable CBC chemistry panel.  Coagulation studies are normal no evidence of acute alcohol intoxication.    Patient admitted by hospitalist service.  Further care and hospital course per their summary    Given the findings on CT imaging the patient's age I certainly consider and be critically ill as he has a significant likelihood of decompensation.  Please note that I spent 37 minutes in direct care of this critically ill patient.  This time was spent in evaluating the patient, reviewing test results, discussion with consultants, preparing the medical record    Impression bilateral subdural hematoma  2) critical care greater than 30 minutes

## 2019-09-24 NOTE — ASSESSMENT & PLAN NOTE
Cleared by PT and OT evaluation  Ortho positive   vitamin B12 and D levels slight low. Started supplement   Home health ordered

## 2019-09-24 NOTE — ED NOTES
Med rec updated and complete. Allergies reviewed.   met with pt/ family at bedside. Wife currently has prescription bottles at bedside. Dicussed current medications.  Prescription bottles returned to family. Family will take medications  Home. Pt took no medications today.  No antibiotic use in last k14 days.  [facility]

## 2019-09-24 NOTE — H&P
Hospital Medicine History & Physical Note    Date of Service  9/23/2019    Primary Care Physician  SKIP Reddy    Consultants  Neurosurgery    Code Status  Full    Chief Complaint  Fall    History of Presenting Illness  89 y.o. male who presented 9/23/2019 with fall.  Patient states that he fell down today, he denies any dizziness or lightheadedness or any syncopal episode.  States that he just tripped and fell, he uses a walker occasionally to be able to get around but his wife states he did not use it just today.  He denies any headache, numbness or weakness, he presented to his primary care provider is scheduled office visit today he told him about the fall.  He was told to go to emergency room, a CT scan of the head was done at outside facility which showed a chronic subdural hematoma at the right, and an acute subdural hematoma on the left which was small.  There was no shift identified.  He was then transferred over here.  She admits he has fallen 3 times over the past month.  He denies any chest pain, palpitations, shortness of breath or diaphoresis.    Review of Systems  Review of Systems   Constitutional: Negative for chills, diaphoresis, fever and malaise/fatigue.   HENT: Negative for hearing loss and sore throat.    Eyes: Negative for blurred vision.   Respiratory: Negative for cough, shortness of breath and wheezing.    Cardiovascular: Negative for chest pain, palpitations and leg swelling.   Gastrointestinal: Negative for abdominal pain, diarrhea, heartburn, nausea and vomiting.   Genitourinary: Negative for dysuria, flank pain and hematuria.   Musculoskeletal: Positive for falls. Negative for back pain, joint pain and neck pain.   Skin: Negative for rash.   Neurological: Negative for dizziness, sensory change, speech change, focal weakness, seizures, loss of consciousness, weakness and headaches.   Psychiatric/Behavioral: The patient is not nervous/anxious.        Past Medical History   has a  past medical history of Hypertension.    Surgical History  None    Family History  Family history is unknown by patient.     Social History   reports that he has never smoked. He does not have any smokeless tobacco history on file. He reports that he drank alcohol. He reports that he does not use drugs.    Allergies  No Known Allergies    Medications  Prior to Admission Medications   Prescriptions Last Dose Informant Patient Reported? Taking?   finasteride (PROSCAR) 5 MG Tab 9/22/2019 at 0800 Patient Yes Yes   Sig: Take 5 mg by mouth every day.   losartan (COZAAR) 25 MG Tab 9/22/2019 at 0800 Patient Yes Yes   Sig: Take 25 mg by mouth every day.   vitamin D (CHOLECALCIFEROL) 1000 UNIT Tab 9/22/2019 at 0800 Patient Yes Yes   Sig: Take 1,000 Units by mouth every day.      Facility-Administered Medications: None       Physical Exam  Temp:  [36.7 °C (98.1 °F)] 36.7 °C (98.1 °F)  Pulse:  [57-89] 58  Resp:  [13-21] 15  BP: (112-183)/(69-82) 169/69  SpO2:  [91 %-94 %] 93 %    Physical Exam   Constitutional: He is oriented to person, place, and time. He appears well-developed.   HENT:   Head: Normocephalic and atraumatic.   Eyes: Pupils are equal, round, and reactive to light. Conjunctivae are normal.   Neck: No tracheal deviation present. No thyromegaly present.   Cardiovascular: Normal rate and regular rhythm.   Pulmonary/Chest: Effort normal and breath sounds normal.   Abdominal: Soft. Bowel sounds are normal. He exhibits no distension. There is no tenderness.   Musculoskeletal: He exhibits no edema.   Lymphadenopathy:     He has no cervical adenopathy.   Neurological: He is alert and oriented to person, place, and time. No cranial nerve deficit.   MMT 5/5   Skin: Skin is warm and dry.   Nursing note and vitals reviewed.      Laboratory:  Recent Labs     09/23/19  1723   WBC 8.7   RBC 4.28*   HEMOGLOBIN 13.0*   HEMATOCRIT 40.7*   MCV 95.1   MCH 30.4   MCHC 31.9*   RDW 46.1   PLATELETCT 263   MPV 8.6*     Recent Labs      09/23/19  1723   SODIUM 139   POTASSIUM 3.5*   CHLORIDE 99   CO2 31   GLUCOSE 91   BUN 25*   CREATININE 0.90   CALCIUM 9.6     Recent Labs     09/23/19  1723   ALTSGPT 11   ASTSGOT 15   ALKPHOSPHAT 74   TBILIRUBIN 0.5   GLUCOSE 91     Recent Labs     09/23/19  1723   APTT 32.3   INR 1.07     No results for input(s): NTPROBNP in the last 72 hours.      No results for input(s): TROPONINT in the last 72 hours.    Urinalysis:    No results found     Imaging:  OUTSIDE IMAGES-CT HEAD   Final Result      CT-HEAD W/O    (Results Pending)         Assessment/Plan:  I anticipate this patient will require at least two midnights for appropriate medical management, necessitating inpatient admission.    * SDH (subdural hematoma) (HCC)- (present on admission)  Assessment & Plan  Neurochecks  Neurosurgery consulted    Frequent falls- (present on admission)  Assessment & Plan  Will need PT and OT evaluation  Will need orthostatics checked  Check vitamin B12 and D levels    Hypomagnesemia- (present on admission)  Assessment & Plan  IV Mg    Hypokalemia- (present on admission)  Assessment & Plan  Start K. Dur, follow BMP, check TSH    HTN (hypertension)- (present on admission)  Assessment & Plan  Continue losartan  IV Vasotec and labetalol as needed with parameters        VTE prophylaxis: SCD

## 2019-09-24 NOTE — PROGRESS NOTES
CT head:  8 mm bilateral subdural fluid collections, chronic on right subacute on left by density.  Mild flattening of cortical surface.  No midline shift.  Fairly significant diffuse cerebral atrophy.    89 year old male with bilateral subdural hemotoma, chronic on right subacute on left.  He is not having over symptoms from this.  Given his age of 89, would not recommend Neurosurgical intervention as complication rate is significantly elevated.

## 2019-09-25 ENCOUNTER — PATIENT OUTREACH (OUTPATIENT)
Dept: HEALTH INFORMATION MANAGEMENT | Facility: OTHER | Age: 84
End: 2019-09-25

## 2019-09-25 VITALS
OXYGEN SATURATION: 95 % | TEMPERATURE: 98.8 F | HEIGHT: 65 IN | WEIGHT: 115.52 LBS | RESPIRATION RATE: 20 BRPM | SYSTOLIC BLOOD PRESSURE: 139 MMHG | HEART RATE: 63 BPM | DIASTOLIC BLOOD PRESSURE: 69 MMHG | BODY MASS INDEX: 19.25 KG/M2

## 2019-09-25 PROBLEM — I95.1 ORTHOSTATIC HYPOTENSION: Status: RESOLVED | Noted: 2019-09-24 | Resolved: 2019-09-25

## 2019-09-25 PROBLEM — E87.6 HYPOKALEMIA: Status: RESOLVED | Noted: 2019-09-23 | Resolved: 2019-09-25

## 2019-09-25 PROBLEM — E83.42 HYPOMAGNESEMIA: Status: RESOLVED | Noted: 2019-09-23 | Resolved: 2019-09-25

## 2019-09-25 LAB
ALBUMIN SERPL BCP-MCNC: 3.5 G/DL (ref 3.2–4.9)
ALBUMIN/GLOB SERPL: 1.3 G/DL
ALP SERPL-CCNC: 60 U/L (ref 30–99)
ALT SERPL-CCNC: 9 U/L (ref 2–50)
ANION GAP SERPL CALC-SCNC: 7 MMOL/L (ref 0–11.9)
AST SERPL-CCNC: 15 U/L (ref 12–45)
BASOPHILS # BLD AUTO: 0.3 % (ref 0–1.8)
BASOPHILS # BLD: 0.02 K/UL (ref 0–0.12)
BILIRUB SERPL-MCNC: 0.6 MG/DL (ref 0.1–1.5)
BUN SERPL-MCNC: 20 MG/DL (ref 8–22)
CALCIUM SERPL-MCNC: 8.9 MG/DL (ref 8.5–10.5)
CHLORIDE SERPL-SCNC: 106 MMOL/L (ref 96–112)
CO2 SERPL-SCNC: 25 MMOL/L (ref 20–33)
CREAT SERPL-MCNC: 0.78 MG/DL (ref 0.5–1.4)
EOSINOPHIL # BLD AUTO: 0.01 K/UL (ref 0–0.51)
EOSINOPHIL NFR BLD: 0.1 % (ref 0–6.9)
ERYTHROCYTE [DISTWIDTH] IN BLOOD BY AUTOMATED COUNT: 44.7 FL (ref 35.9–50)
GLOBULIN SER CALC-MCNC: 2.7 G/DL (ref 1.9–3.5)
GLUCOSE SERPL-MCNC: 111 MG/DL (ref 65–99)
HCT VFR BLD AUTO: 36.1 % (ref 42–52)
HGB BLD-MCNC: 12 G/DL (ref 14–18)
IMM GRANULOCYTES # BLD AUTO: 0.02 K/UL (ref 0–0.11)
IMM GRANULOCYTES NFR BLD AUTO: 0.3 % (ref 0–0.9)
LYMPHOCYTES # BLD AUTO: 1.26 K/UL (ref 1–4.8)
LYMPHOCYTES NFR BLD: 17.1 % (ref 22–41)
MAGNESIUM SERPL-MCNC: 2.1 MG/DL (ref 1.5–2.5)
MCH RBC QN AUTO: 31.4 PG (ref 27–33)
MCHC RBC AUTO-ENTMCNC: 33.2 G/DL (ref 33.7–35.3)
MCV RBC AUTO: 94.5 FL (ref 81.4–97.8)
MONOCYTES # BLD AUTO: 0.66 K/UL (ref 0–0.85)
MONOCYTES NFR BLD AUTO: 9 % (ref 0–13.4)
NEUTROPHILS # BLD AUTO: 5.38 K/UL (ref 1.82–7.42)
NEUTROPHILS NFR BLD: 73.2 % (ref 44–72)
NRBC # BLD AUTO: 0 K/UL
NRBC BLD-RTO: 0 /100 WBC
PHOSPHATE SERPL-MCNC: 2.5 MG/DL (ref 2.5–4.5)
PLATELET # BLD AUTO: 230 K/UL (ref 164–446)
PMV BLD AUTO: 8.6 FL (ref 9–12.9)
POTASSIUM SERPL-SCNC: 3.7 MMOL/L (ref 3.6–5.5)
PROT SERPL-MCNC: 6.2 G/DL (ref 6–8.2)
RBC # BLD AUTO: 3.82 M/UL (ref 4.7–6.1)
SODIUM SERPL-SCNC: 138 MMOL/L (ref 135–145)
WBC # BLD AUTO: 7.4 K/UL (ref 4.8–10.8)

## 2019-09-25 PROCEDURE — 83735 ASSAY OF MAGNESIUM: CPT

## 2019-09-25 PROCEDURE — 700102 HCHG RX REV CODE 250 W/ 637 OVERRIDE(OP): Performed by: FAMILY MEDICINE

## 2019-09-25 PROCEDURE — 97166 OT EVAL MOD COMPLEX 45 MIN: CPT

## 2019-09-25 PROCEDURE — 85025 COMPLETE CBC W/AUTO DIFF WBC: CPT

## 2019-09-25 PROCEDURE — 700102 HCHG RX REV CODE 250 W/ 637 OVERRIDE(OP): Performed by: INTERNAL MEDICINE

## 2019-09-25 PROCEDURE — 80053 COMPREHEN METABOLIC PANEL: CPT

## 2019-09-25 PROCEDURE — 700111 HCHG RX REV CODE 636 W/ 250 OVERRIDE (IP): Performed by: INTERNAL MEDICINE

## 2019-09-25 PROCEDURE — 700111 HCHG RX REV CODE 636 W/ 250 OVERRIDE (IP): Performed by: FAMILY MEDICINE

## 2019-09-25 PROCEDURE — 36415 COLL VENOUS BLD VENIPUNCTURE: CPT

## 2019-09-25 PROCEDURE — 99239 HOSP IP/OBS DSCHRG MGMT >30: CPT | Performed by: INTERNAL MEDICINE

## 2019-09-25 PROCEDURE — A9270 NON-COVERED ITEM OR SERVICE: HCPCS | Performed by: INTERNAL MEDICINE

## 2019-09-25 PROCEDURE — 84100 ASSAY OF PHOSPHORUS: CPT

## 2019-09-25 PROCEDURE — A9270 NON-COVERED ITEM OR SERVICE: HCPCS | Performed by: FAMILY MEDICINE

## 2019-09-25 RX ORDER — LOSARTAN POTASSIUM 50 MG/1
50 TABLET ORAL DAILY
Qty: 90 TAB | Refills: 0 | Status: SHIPPED | OUTPATIENT
Start: 2019-09-26

## 2019-09-25 RX ORDER — AMLODIPINE BESYLATE 5 MG/1
5 TABLET ORAL
Status: DISCONTINUED | OUTPATIENT
Start: 2019-09-25 | End: 2019-09-25 | Stop reason: HOSPADM

## 2019-09-25 RX ORDER — LOSARTAN POTASSIUM 50 MG/1
50 TABLET ORAL DAILY
Status: DISCONTINUED | OUTPATIENT
Start: 2019-09-25 | End: 2019-09-25 | Stop reason: HOSPADM

## 2019-09-25 RX ORDER — FAMOTIDINE 20 MG/1
20 TABLET, FILM COATED ORAL 2 TIMES DAILY
Qty: 60 TAB | Refills: 0 | Status: SHIPPED | OUTPATIENT
Start: 2019-09-25 | End: 2019-09-25

## 2019-09-25 RX ORDER — AMLODIPINE BESYLATE 5 MG/1
5 TABLET ORAL DAILY
Qty: 90 TAB | Refills: 0 | Status: SHIPPED | OUTPATIENT
Start: 2019-09-26 | End: 2019-09-25

## 2019-09-25 RX ORDER — AMLODIPINE BESYLATE 5 MG/1
5 TABLET ORAL DAILY
Qty: 90 TAB | Refills: 0 | Status: SHIPPED | OUTPATIENT
Start: 2019-09-26

## 2019-09-25 RX ORDER — FAMOTIDINE 20 MG/1
20 TABLET, FILM COATED ORAL 2 TIMES DAILY
Qty: 60 TAB | Refills: 0 | Status: SHIPPED | OUTPATIENT
Start: 2019-09-25

## 2019-09-25 RX ORDER — LOSARTAN POTASSIUM 50 MG/1
50 TABLET ORAL DAILY
Qty: 90 TAB | Refills: 1 | Status: SHIPPED | OUTPATIENT
Start: 2019-09-26 | End: 2019-09-25

## 2019-09-25 RX ADMIN — FINASTERIDE 5 MG: 5 TABLET, FILM COATED ORAL at 06:15

## 2019-09-25 RX ADMIN — LOSARTAN POTASSIUM 25 MG: 25 TABLET ORAL at 06:14

## 2019-09-25 RX ADMIN — FAMOTIDINE 20 MG: 20 TABLET, FILM COATED ORAL at 06:15

## 2019-09-25 RX ADMIN — ENALAPRILAT 1.25 MG: 1.25 INJECTION INTRAVENOUS at 05:58

## 2019-09-25 RX ADMIN — CYANOCOBALAMIN TAB 500 MCG 1000 MCG: 500 TAB at 06:17

## 2019-09-25 RX ADMIN — LABETALOL HYDROCHLORIDE 10 MG: 5 INJECTION, SOLUTION INTRAVENOUS at 04:37

## 2019-09-25 RX ADMIN — CYANOCOBALAMIN 1000 MCG: 1000 INJECTION, SOLUTION INTRAMUSCULAR; SUBCUTANEOUS at 04:37

## 2019-09-25 RX ADMIN — LOSARTAN POTASSIUM 50 MG: 50 TABLET ORAL at 08:09

## 2019-09-25 RX ADMIN — VITAMIN D, TAB 1000IU (100/BT) 1000 UNITS: 25 TAB at 06:16

## 2019-09-25 RX ADMIN — AMLODIPINE BESYLATE 5 MG: 5 TABLET ORAL at 08:09

## 2019-09-25 RX ADMIN — POTASSIUM CHLORIDE 20 MEQ: 1500 TABLET, EXTENDED RELEASE ORAL at 06:17

## 2019-09-25 ASSESSMENT — COGNITIVE AND FUNCTIONAL STATUS - GENERAL
HELP NEEDED FOR BATHING: A LITTLE
DRESSING REGULAR UPPER BODY CLOTHING: A LITTLE
SUGGESTED CMS G CODE MODIFIER DAILY ACTIVITY: CJ
TOILETING: A LITTLE
DRESSING REGULAR LOWER BODY CLOTHING: A LITTLE
DAILY ACTIVITIY SCORE: 20

## 2019-09-25 ASSESSMENT — ACTIVITIES OF DAILY LIVING (ADL): TOILETING: REQUIRES ASSIST

## 2019-09-25 NOTE — DISCHARGE PLANNING
ARNEL spoke with Claire from [facility] and she states that they are contracted with [facility]. Pt and his spouse are in agreement with referral to [facility]. ARNEL completed HH choice form and sent to [facility].

## 2019-09-25 NOTE — PROGRESS NOTES
Neurosurgery Progress Note    Subjective:  No acute events. Denies headaches.     Exam: Seen in conjunction with Dr Shetty  A&O x3. Fluent Speech.   3 PERRL, EOMI. Denies blurry or double vision.   Tongue midline without fasciculation. No facial droop. No drift.   Hearing intact.   Strength: Bilateral  bicep, tricep, deltoid,  5/5.                   Bilateral IP, DF, PF 5/5.   Sensation: Intact throughout.   Eating, drinking. Denies n/v.  Voiding.  Pain controlled.   Ambulatory.     BP  Min: 112/80  Max: 175/79  Pulse  Av.2  Min: 56  Max: 84  Resp  Av.5  Min: 15  Max: 18  Temp  Av.9 °C (98.4 °F)  Min: 36.4 °C (97.6 °F)  Max: 37.4 °C (99.4 °F)  SpO2  Av %  Min: 91 %  Max: 96 %    No data recorded    Recent Labs     19  1723 19  0253   WBC 8.7 7.7   RBC 4.28* 3.96*   HEMOGLOBIN 13.0* 12.5*   HEMATOCRIT 40.7* 37.8*   MCV 95.1 95.5   MCH 30.4 31.6   MCHC 31.9* 33.1*   RDW 46.1 45.1   PLATELETCT 263 226   MPV 8.6* 8.9*     Recent Labs     19  1723 19  0253   SODIUM 139 138   POTASSIUM 3.5* 3.6   CHLORIDE 99 103   CO2 31 26   GLUCOSE 91 89   BUN 25* 21   CREATININE 0.90 0.72   CALCIUM 9.6 8.8     Recent Labs     19  1723   APTT 32.3   INR 1.07     Recent Labs     19  1722   REACTMIN 6.4   CLOTKINET 1.7   CLOTANGL 67.8   MAXCLOTS 69.2   CNV61KNA 0.2   PRCINADP 0.0   PRCINAA 2.9       Intake/Output       19 07 - 19 0659 19 - 1959      [id number] 3790-6138 Total [id number] Total       Intake    P.O.  --  -- --  240  -- 240    P.O. -- -- -- 240 -- 240    I.V.  0  -- 0  --  -- --    Pre-Hospital Volume 0 -- 0 -- -- --    Trauma Resuscitation Volume 0 -- 0 -- -- --    Blood  0  -- 0  --  -- --    PRBC Total Volume (Non-Barcoded) 0 -- 0 -- -- --    FFP Total Volume (Non-Barcoded) 0 -- 0 -- -- --    Platelets Total Volume (Non-Barcoded) 0 -- 0 -- -- --    Cryoprecipitate (Pooled) Total Volume (Non-Barcoded) 0 -- 0 -- -- --     Total Intake 0 -- 0 240 -- 240       Output    Urine  --  -- --  50  -- 50    Number of Times Voided -- -- -- 1 x -- 1 x    Urine Void (mL) -- -- -- 50 -- 50    Other  0  -- 0  --  -- --    Pre-Hospital Output 0 -- 0 -- -- --    Trauma Resuscitation Output 0 -- 0 -- -- --    Blood  0  -- 0  --  -- --    Est. Blood Loss 0 -- 0 -- -- --    Total Output 0 -- 0 50 -- 50       Net I/O     0 -- 0 190 -- 190            Intake/Output Summary (Last 24 hours) at 9/24/2019 1803  Last data filed at 9/24/2019 1300  Gross per 24 hour   Intake 240 ml   Output 50 ml   Net 190 ml            • vitamin D (Ergocalciferol)  50,000 Units Q7 DAYS   • cyanocobalamin  1,000 mcg DAILY   • cyanocobalamin  1,000 mcg DAILY   • finasteride  5 mg DAILY   • losartan  25 mg DAILY   • vitamin D  1,000 Units DAILY   • senna-docusate  2 Tab BID    And   • polyethylene glycol/lytes  1 Packet QDAY PRN    And   • magnesium hydroxide  30 mL QDAY PRN    And   • bisacodyl  10 mg QDAY PRN   • acetaminophen  650 mg Q6HRS PRN   • enalaprilat  1.25 mg Q6HRS PRN   • labetalol  10 mg Q4HRS PRN   • potassium chloride SA  20 mEq DAILY       Assessment and Plan:  Hospital day #1  POD #na  Prophylactic anticoagulation: no         Start date/time: TBD    Plan:  Stable neuro exam  No surgical intervention indicated.   No ASA or anticoagulants until cleared by neurosurgery.   Will have patient follow up in our clinic in 2-3 weeks with follow up head ct.   Discussed with family and patient at bedside  Neurosurgery will sign off at this time. Please call with any questions

## 2019-09-25 NOTE — ASSESSMENT & PLAN NOTE
Positive orthostatic. That is reason of multiple falls   HCTZ at  Home   IV fluid at this time   Close monitoring of BP otherwise  Consider midodrine if still persistent.

## 2019-09-25 NOTE — DISCHARGE SUMMARY
Discharge Summary    CHIEF COMPLAINT ON ADMISSION  Chief Complaint   Patient presents with   • Trauma Yellow   • T-5000 GLF       Reason for Admission  Yellow- Fall     Admission Date  9/23/2019    CODE STATUS  Full Code    HPI & HOSPITAL COURSE    89-year-old male past medical history of hypertension, multiple falls admitted for chronic subacute subdural hematoma.  Neurosurgery was consulted and recommended follow-up as outpatient with repeat CT scan in 2 weeks.  Avoid blood thinners including aspirin, NSAID.  In regards to multiple falls patient was found to be orthostatic positive however that improved with IV fluids.  He was sent home losartan 50 mg daily and amlodipine 5 mg daily since blood pressure was not well controlled while in the hospital stay while he was laying down.  I did advise patient to stop hydrochlorothiazide and discussed that with his primary care at [facility].   Electrolytes including hypomagnesia, hypokalemia were repleted  Also he was found to have vitamin D and vitamin B12 deficiency and they were also repleted.   Patient was medical cleared by PT/OT and home health was arranged.  His wife is main caregiver and great support to patient.  Patient medical stable to close follow-up with primary care at [facility]    Therefore, he is discharged in guarded and stable condition to home with close outpatient follow-up.    The patient met 2-midnight criteria for an inpatient stay at the time of discharge.    Discharge Date  09/25/2019     FOLLOW UP ITEMS POST DISCHARGE  None     DISCHARGE DIAGNOSES  Principal Problem:    SDH (subdural hematoma) (HCC) POA: Yes  Active Problems:    Frequent falls POA: Yes    HTN (hypertension) POA: Yes    Vitamin B12 deficiency POA: Unknown    Vitamin D deficiency POA: Unknown  Resolved Problems:    Hypokalemia POA: Yes    Hypomagnesemia POA: Yes    Orthostatic hypotension POA: Unknown      FOLLOW UP  No future appointments.  Melvi Gaines, PGabrielAGabriel  [address]  [address]  [facility] [address]  [phone]    In 2 weeks        MEDICATIONS ON DISCHARGE     Medication List      START taking these medications      Instructions   amLODIPine 5 MG Tabs  Start taking on:  9/26/2019  Commonly known as:  NORVASC   Take 1 Tab by mouth every day.  Dose:  5 mg     cyanocobalamin 1000 MCG Tabs  Start taking on:  9/26/2019  Commonly known as:  VITAMIN B12   Take 1 Tab by mouth every day.  Dose:  1,000 mcg     famotidine 20 MG Tabs  Commonly known as:  PEPCID   Take 1 Tab by mouth 2 Times a Day.  Dose:  20 mg        CHANGE how you take these medications      Instructions   losartan 50 MG Tabs  Start taking on:  9/26/2019  What changed:    · medication strength  · how much to take  Commonly known as:  COZAAR   Take 1 Tab by mouth every day.  Dose:  50 mg        CONTINUE taking these medications      Instructions   finasteride 5 MG Tabs  Commonly known as:  PROSCAR   Take 5 mg by mouth every day.  Dose:  5 mg     vitamin D 1000 UNIT Tabs  Commonly known as:  cholecalciferol   Take 1,000 Units by mouth every day.  Dose:  1,000 Units            Allergies  No Known Allergies    DIET  Orders Placed This Encounter   Procedures   • Diet Order Regular     Standing Status:   Standing     Number of Occurrences:   1     Order Specific Question:   Diet:     Answer:   Regular [1]       ACTIVITY  As tolerated.  Weight bearing as tolerated    CONSULTATIONS  Neurosurgery    PROCEDURES  None     LABORATORY  Lab Results   Component Value Date    SODIUM 138 09/25/2019    POTASSIUM 3.7 09/25/2019    CHLORIDE 106 09/25/2019    CO2 25 09/25/2019    GLUCOSE 111 (H) 09/25/2019    BUN 20 09/25/2019    CREATININE 0.78 09/25/2019        Lab Results   Component Value Date    WBC 7.4 09/25/2019    HEMOGLOBIN 12.0 (L) 09/25/2019    HEMATOCRIT 36.1 (L) 09/25/2019    PLATELETCT 230 09/25/2019        Total time of the discharge process exceeds 30 minutes.

## 2019-09-25 NOTE — DISCHARGE PLANNING
Received Choice form at 1050  Agency[facility]Facility Name: [facility]  Referral sent per Choice form @ 1050     @1310  Agency[facility]Facility Name: [facility]  Spoke To: Cassandra  Outcome: Accepted.

## 2019-09-25 NOTE — PROGRESS NOTES
[facility] Medicine Daily Progress Note    Date of Service  9/24/2019    Chief Complaint  89 y.o. male admitted 9/23/2019 with subdural hematoma     Hospital Course    89-year-old male past medical history of hypertension, multiple falls admitted for chronic subacute subdural hematoma.      Interval Problem Update  He denies any headache, chest pain shortness of breath.  He still reports feeling dizzy.  Orthostatics positive.  No further intervention from neurosurgery.  Needs home health arrangement    Consultants/Specialty  Neurosurgery    Code Status  Full code    Disposition  Inpatient    Review of Systems  Review of Systems   Constitutional: Negative for chills and fever.   Eyes: Negative for blurred vision and double vision.   Respiratory: Negative for cough and shortness of breath.    Cardiovascular: Negative for chest pain and leg swelling.   Gastrointestinal: Negative for abdominal pain, constipation, diarrhea, heartburn, nausea and vomiting.   Genitourinary: Negative for dysuria and urgency.   Musculoskeletal: Negative for myalgias.   Neurological: Positive for dizziness. Negative for tingling, tremors and headaches.        Physical Exam  Temp:  [36.4 °C (97.6 °F)-37.4 °C (99.4 °F)] 37.3 °C (99.2 °F)  Pulse:  [56-84] 58  Resp:  [15-18] 17  BP: (112-175)/(63-82) 155/80  SpO2:  [92 %-96 %] 94 %    Physical Exam   Constitutional: He is oriented to person, place, and time. He appears well-developed and well-nourished. No distress.   HENT:   Head: Normocephalic and atraumatic.   Neck: Normal range of motion.   Cardiovascular: Normal rate and regular rhythm.   No murmur heard.  Pulmonary/Chest: Effort normal and breath sounds normal. No respiratory distress. He has no wheezes.   Abdominal: Soft. Bowel sounds are normal. He exhibits no distension. There is no tenderness. There is no rebound.   Musculoskeletal: Normal range of motion. He exhibits no edema.   Neurological: He is alert and oriented to person, place,  and time. No cranial nerve deficit.   Skin: Skin is warm.   Psychiatric: He has a normal mood and affect. His behavior is normal.   Nursing note and vitals reviewed.      Fluids    Intake/Output Summary (Last 24 hours) at 9/24/2019 2001  Last data filed at 9/24/2019 1800  Gross per 24 hour   Intake 786.67 ml   Output 50 ml   Net 736.67 ml       Laboratory  Recent Labs     09/23/19  1723 09/24/19  0253   WBC 8.7 7.7   RBC 4.28* 3.96*   HEMOGLOBIN 13.0* 12.5*   HEMATOCRIT 40.7* 37.8*   MCV 95.1 95.5   MCH 30.4 31.6   MCHC 31.9* 33.1*   RDW 46.1 45.1   PLATELETCT 263 226   MPV 8.6* 8.9*     Recent Labs     09/23/19  1723 09/24/19  0253   SODIUM 139 138   POTASSIUM 3.5* 3.6   CHLORIDE 99 103   CO2 31 26   GLUCOSE 91 89   BUN 25* 21   CREATININE 0.90 0.72   CALCIUM 9.6 8.8     Recent Labs     09/23/19  1723   APTT 32.3   INR 1.07               Imaging  CT-HEAD W/O   Final Result      Stable bilateral frontoparietal convexity subdural hematomas as detailed with mild localized mass effect. No midline shift or herniation.      OUTSIDE IMAGES-CT HEAD   Final Result           Assessment/Plan  * SDH (subdural hematoma) (HCC)- (present on admission)  Assessment & Plan  Neurochecks  Neurosurgery consulted. No further intervention   Avoid NSAIDs, blood thinner   Need repeat head CT in 2-3 weeks. Follow up with neurosurgery as OP    Frequent falls- (present on admission)  Assessment & Plan  Cleared by PT and OT evaluation  Ortho positive   vitamin B12 and D levels slight low. Started supplement   Home health ordered     Hypomagnesemia- (present on admission)  Assessment & Plan  IV Mg    Hypokalemia- (present on admission)  Assessment & Plan  Continue to monitor and replete     HTN (hypertension)- (present on admission)  Assessment & Plan  Continue losartan  IV Vasotec and labetalol as needed with parameters    Vitamin D deficiency  Assessment & Plan  Started on supplement  Continue to monitor     Vitamin B12 deficiency  Assessment  & Plan  Started supplement   Continue to monitor     Orthostatic hypotension  Assessment & Plan  Positive orthostatic. That is reason of multiple falls   HCTZ at  Home   IV fluid at this time   Close monitoring of BP otherwise  Consider midodrine if still persistent.          VTE prophylaxis: SCD

## 2019-09-25 NOTE — PROGRESS NOTES
Patient A&Ox4. Given discharge instructions, discussed diet, activity, follow up appointments, symptoms & management, and prescriptions sent to preferred pharmacy. Packet sent with patient, IV d/c'd, telemetry box off and all questions answered. Patient transported via wheelchair to [facility] with belongings.

## 2019-09-25 NOTE — FACE TO FACE
Face to Face Supporting Documentation - Home Health    The encounter with this patient was in whole or in part the primary reason for home health admission.    Date of encounter:   Patient:                    MRN:                       YOB: 2019  Fred Osborne  [id number]  5/29/1926     Home health to see patient for:  Skilled Nursing care for assessment, interventions & education, Physical Therapy evaluation and treatment and Comment: orthostatic positive, multiple falls. safety. physical therapy     Skilled need for:  Exacerbation of Chronic Disease State multiple falls. s/p subdural hematoma, Medication Management hypertension, orthostatic monitoring  and Comment: safety, physical therapy     Skilled nursing interventions to include:  Comment: medication, physical therapy , safety, vitals     Homebound status evidenced by:  Needs the assistance of another person in order to leave the home or Have a condition such that leaving his or her home is medically contraindicated. Leaving home requires a considerable and taxing effort. There is a normal inability to leave the home.    Community Physician to provide follow up care: SKIP Reddy     Optional Interventions? No      I certify the face to face encounter for this home health care referral meets the CMS requirements and the encounter/clinical assessment with the patient was, in whole, or in part, for the medical condition(s) listed above, which is the primary reason for home health care. Based on my clinical findings: the service(s) are medically necessary, support the need for home health care, and the homebound criteria are met.  I certify that this patient has had a face to face encounter by myself.  Emery Durham M.D. - NPI: [id number]

## 2019-09-25 NOTE — THERAPY
"Occupational Therapy Evaluation completed.   Functional Status:  SPV supine>sit w/HOB elevated, SPV sit>stand, Liborio functional mobility of household spaces w/FWW, Liborio toilet txf, SPV brief standing grooming at sink (handwashing)  Plan of Care: Will benefit from Occupational Therapy 3 times per week  Discharge Recommendations:  Equipment: Will Continue to Assess for Equipment Needs. Post-acute therapy Recommend home health transitional care for continued occupational therapy services.     See \"Rehab Therapy-Acute\" Patient Summary Report for complete documentation.    Pt is 89yo male admitted with chronic subdural hematoma, pmhx includes HTN and multiple falls. Pt presents to OT eval close to baseline of functional independence, very supportive wife present for evaluation and confirms her ability to assist as needed 24hrs/day. Pt demonstrated basic ADLs and ADL transfers with Liborio provided for safety. Recommend pt d/c home with spouse support and home health therapy for continued strengthening and activity tolerance increasing therapeutic activities in the home setting. Acute OT to follow while in-house.   "

## 2019-09-25 NOTE — DISCHARGE INSTRUCTIONS
Discharge Instructions per Emery Durham M.D.  Increase losartan to 50 mg daily   Start new medications for blood pressure called amlodipine   See primary care within 2 weeks   Follow up with neurosurgery within 2 weeks. Need repeat Head CT within 2 weeks   Slowly getting up due to difference on blood pressure     DIET: healthy, regular diet     ACTIVITY: as tolerated. Slow movement     DIAGNOSIS: subdural hematoma, orthostatic hypotension     Return to ER if worsen headache, confusion, chest pain, shortness of breath, fever             Discharge Instructions    Discharged to home by car with relative. Discharged via wheelchair, hospital escort: Yes.  Special equipment needed: Not Applicable    Be sure to schedule a follow-up appointment with your primary care doctor or any specialists as instructed.     Discharge Plan:   Influenza Vaccine Indication: Patient Refuses    I understand that a diet low in cholesterol, fat, and sodium is recommended for good health. Unless I have been given specific instructions below for another diet, I accept this instruction as my diet prescription.   Other diet: regular    Special Instructions: None    · Is patient discharged on Warfarin / Coumadin?   No       Subdural Hematoma  A subdural hematoma is a collection of blood between the brain and its tough outermost membrane covering (the dura).  Blood clots that form in this area push down on the brain and cause irritation. A subdural hematoma may cause parts of the brain to stop working and eventually cause death.   CAUSES  A subdural hematoma is caused by bleeding from a ruptured blood vessel (hemorrhage). The bleeding results from trauma to the head, such as from a fall or motor vehicle accident.  There are two types of subdural hemorrhages:  · Acute. This type develops shortly after a serious blow to the head and causes blood to collect very quickly. If not diagnosed and treated promptly, severe brain injury or death can  occur.  · Chronic. This is when bleeding develops more slowly, over weeks or months.  RISK FACTORS  People at risk for subdural hematoma include older persons, infants, and alcoholics.  SYMPTOMS  An acute subdural hemorrhage develops over minutes to hours. Symptoms can include:  · Temporary loss of consciousness.  · Weakness of arms or legs on one side of the body.  · Changes in vision or speech.  · A severe headache.  · Seizures.  · Nausea and vomiting.  · Increased sleepiness.  A chronic subdural hemorrhage develops over weeks to months. Symptoms may develop slowly and produce less noticeable problems or changes. Symptoms include:  · A mild headache.  · A change in personality.  · Loss of balance or difficulty walking.  · Weakness, numbness, or tingling in the arms or legs.  · Nausea or vomiting.  · Memory loss.  · Double vision.  · Increased sleepiness.  DIAGNOSIS  Your health care provider will perform a thorough physical and neurological exam. A CT scan or MRI may also be done. If there is blood on the scan, its color will help your health care provider determine how long the hemorrhage has been there.  TREATMENT  If the cause is an acute subdural hemorrhage, immediate treatment is needed. In many cases an emergency surgery is performed to drain accumulated blood or to remove the blood clot. Sometimes steroid or diuretic medicines or controlled breathing through a ventilator is needed to decrease pressure in the brain. This is especially true if there is any swelling of the brain.  If the cause is a chronic subdural hemorrhage, treatment depends on a variety of factors. Sometimes no treatment is needed. If the subdural hematoma is small and causes minimal or no symptoms, you may be treated with bed rest, medicines, and observation. If the hemorrhage is large or if you have neurological symptoms, an emergency surgery is usually needed to remove the blood clot.  People who develop a subdural hemorrhage are at  risk of developing seizures, even after the subdural hematoma has been treated. You may be prescribed an anti-seizure (anticonvulsant) medicine for a year or longer.  HOME CARE INSTRUCTIONS  · Only take medicines as directed by your health care provider.  · Rest if directed by your health care provider.  · Keep all follow-up appointments with your health care provider.  · If you play a contact sport such as football, hockey or soccer and you experienced a significant head injury, allow enough time for healing (up to 15 days) before you start playing again. A repeated injury that occurs during this fragile repair period is likely to result in hemorrhage. This is called the second impact syndrome.  SEEK IMMEDIATE MEDICAL CARE IF:  · You fall or experience minor trauma to your head and you are taking blood thinners. If you are on any blood thinners even a very small injury can cause a subdural hematoma. You should not hesitate to seek medical attention regardless of how minor you think your symptoms are.  · You experience a head injury and have:  ¨ Drowsiness or a decrease in alertness.  ¨ Confusion or forgetfulness.  ¨ Slurred speech.  ¨ Irrational or aggressive behavior.  ¨ Numbness or paralysis in any part of the body.  ¨ A feeling of being sick to your stomach (nauseous) or you throw up (vomit).  ¨ Difficulty walking or poor coordination.  ¨ Double vision.  ¨ Seizures.  ¨ A bleeding disorder.  ¨ A history of heavy alcohol use.  ¨ Clear fluid draining from your nose or ears.  ¨ Personality changes.  ¨ Difficulty thinking.  ¨ Worsening symptoms.  MAKE SURE YOU:  · Understand these instructions.  · Will watch your condition.  · Will get help right away if you are not doing well or get worse.  FOR MORE INFORMATION  National [place] of Neurological Disorders and Stroke: www.ninds.nih.gov  American Association of Neurological Surgeons: www.neurosurgerytoday.org  American Academy of Neurology (AAN): www.aan.com  Brain  Injury Association of [place]: www.biausa.org  This information is not intended to replace advice given to you by your health care provider. Make sure you discuss any questions you have with your health care provider.  Document Released: 11/04/2005 Document Revised: 10/08/2014 Document Reviewed: 06/20/2014  [employer] Interactive Patient Education © 2017 [employer]      Depression / Suicide Risk    As you are discharged from this [facility] facility, it is important to learn how to keep safe from harming yourself.    Recognize the warning signs:  · Abrupt changes in personality, positive or negative- including increase in energy   · Giving away possessions  · Change in eating patterns- significant weight changes-  positive or negative  · Change in sleeping patterns- unable to sleep or sleeping all the time   · Unwillingness or inability to communicate  · Depression  · Unusual sadness, discouragement and loneliness  · Talk of wanting to die  · Neglect of personal appearance   · Rebelliousness- reckless behavior  · Withdrawal from people/activities they love  · Confusion- inability to concentrate     If you or a loved one observes any of these behaviors or has concerns about self-harm, here's what you can do:  · Talk about it- your feelings and reasons for harming yourself  · Remove any means that you might use to hurt yourself (examples: pills, rope, extension cords, firearm)  · Get professional help from the community (Mental Health, Substance Abuse, psychological counseling)  · Do not be alone:Call your Safe Contact- someone whom you trust who will be there for you.  · Call your local CRISIS HOTLINE 675-3144 or 049-283-6053  · Call your local Children's Mobile Crisis Response Team [place] (021) 626-0809 or www.[employer]  · Call the toll free National Suicide Prevention Hotlines   · National Suicide Prevention Lifeline 213-627-KQBG (0302)  · National Hope Line Network 800-SUICIDE (143-0321)

## 2019-09-25 NOTE — CARE PLAN
Problem: Infection  Goal: Will remain free from infection  Intervention: Implement standard precautions and perform hand washing before and after patient contact  Note:   Patient educated on hand hygiene and importance of cleanliness. Patient verbalizes understanding. RN performs appropriate hand hygiene and maintains aseptic technique / standard precautions when in contact with patient.      Problem: Knowledge Deficit  Goal: Knowledge of disease process/condition, treatment plan, diagnostic tests, and medications will improve  Intervention: Explain information regarding disease process/condition, treatment plan, diagnostic tests, and medications and document in education  Note:   Patient educated on POC, treatment plan, medications, diet, safety, activity, discharge plan, and encouraged to ask questions regarding care. Patient and family verbalizes understanding. Reoriented to call light for assistance. Hourly rounding in place.

## 2019-09-25 NOTE — DISCHARGE PLANNING
Anticipated Discharge Disposition: Home with HH    Action: CM received order for HH and met with pt and his wife at bedside. They are in agreement for HH and would like to choose whoever is contracted with [facility]. CM called [facility] ARNEL Rangel and left a message to please call this CM back.     Barriers to Discharge:     Plan: Await call back from Claire for companies contracted with [facility] for HH.

## 2019-09-25 NOTE — PROGRESS NOTES
12 hr cc complete    Received report from NOC RN. Assumed care of patient at 0700. Patient A&Ox4, speaking in full sentences, follows commands and responds appropriately to questions.  Wife at bedside. On room air, no signs of respiratory distress. Respirations are even and unlabored. Patient denies pain at this time. POC discussed and agreed upon with patient. Call light and belongings within reach. Bed in lowest locked position. Upper side rails raised. Fall risk precautions in place. Hourly rounding. Will continue to monitor.